# Patient Record
Sex: MALE | Employment: FULL TIME | URBAN - METROPOLITAN AREA
[De-identification: names, ages, dates, MRNs, and addresses within clinical notes are randomized per-mention and may not be internally consistent; named-entity substitution may affect disease eponyms.]

---

## 2018-08-21 ENCOUNTER — HOSPITAL ENCOUNTER (OUTPATIENT)
Dept: LAB | Age: 48
Discharge: HOME | End: 2018-08-21

## 2018-08-21 LAB — SENTARA SPECIMEN COL,SENBCF: NORMAL

## 2018-08-21 PROCEDURE — 99001 SPECIMEN HANDLING PT-LAB: CPT | Performed by: INTERNAL MEDICINE

## 2018-09-28 ENCOUNTER — HOSPITAL ENCOUNTER (OUTPATIENT)
Dept: LAB | Age: 48
Discharge: HOME | End: 2018-09-28

## 2018-09-28 LAB — SENTARA SPECIMEN COL,SENBCF: NORMAL

## 2018-09-28 PROCEDURE — 99001 SPECIMEN HANDLING PT-LAB: CPT | Performed by: INTERNAL MEDICINE

## 2019-06-20 ENCOUNTER — APPOINTMENT (OUTPATIENT)
Dept: CT IMAGING | Age: 49
End: 2019-06-20
Attending: EMERGENCY MEDICINE
Payer: SELF-PAY

## 2019-06-20 ENCOUNTER — HOSPITAL ENCOUNTER (EMERGENCY)
Age: 49
Discharge: HOME | End: 2019-06-20
Attending: EMERGENCY MEDICINE
Payer: SELF-PAY

## 2019-06-20 VITALS
BODY MASS INDEX: 29.02 KG/M2 | HEART RATE: 59 BPM | DIASTOLIC BLOOD PRESSURE: 94 MMHG | SYSTOLIC BLOOD PRESSURE: 130 MMHG | WEIGHT: 170 LBS | OXYGEN SATURATION: 97 % | HEIGHT: 64 IN | RESPIRATION RATE: 17 BRPM | TEMPERATURE: 98.1 F

## 2019-06-20 DIAGNOSIS — N20.0 KIDNEY STONE: Primary | ICD-10-CM

## 2019-06-20 LAB
ALBUMIN SERPL-MCNC: 3.9 G/DL (ref 3.4–5)
ALBUMIN/GLOB SERPL: 1.2 {RATIO} (ref 0.8–1.7)
ALP SERPL-CCNC: 105 U/L (ref 45–117)
ALT SERPL-CCNC: 52 U/L (ref 16–61)
ANION GAP SERPL CALC-SCNC: 9 MMOL/L (ref 3–18)
APPEARANCE UR: ABNORMAL
AST SERPL-CCNC: 25 U/L (ref 15–37)
BACTERIA URNS QL MICRO: ABNORMAL /HPF
BASOPHILS # BLD: 0.1 K/UL (ref 0–0.1)
BASOPHILS NFR BLD: 1 % (ref 0–2)
BILIRUB SERPL-MCNC: 0.5 MG/DL (ref 0.2–1)
BILIRUB UR QL: NEGATIVE
BUN SERPL-MCNC: 20 MG/DL (ref 7–18)
BUN/CREAT SERPL: 19 (ref 12–20)
CALCIUM SERPL-MCNC: 8.9 MG/DL (ref 8.5–10.1)
CAOX CRY URNS QL MICRO: ABNORMAL
CHLORIDE SERPL-SCNC: 106 MMOL/L (ref 100–108)
CO2 SERPL-SCNC: 25 MMOL/L (ref 21–32)
COLOR UR: YELLOW
CREAT SERPL-MCNC: 1.03 MG/DL (ref 0.6–1.3)
DIFFERENTIAL METHOD BLD: NORMAL
EOSINOPHIL # BLD: 0.1 K/UL (ref 0–0.4)
EOSINOPHIL NFR BLD: 2 % (ref 0–5)
EPITH CASTS URNS QL MICRO: ABNORMAL /LPF (ref 0–5)
ERYTHROCYTE [DISTWIDTH] IN BLOOD BY AUTOMATED COUNT: 12.7 % (ref 11.6–14.5)
GLOBULIN SER CALC-MCNC: 3.2 G/DL (ref 2–4)
GLUCOSE SERPL-MCNC: 228 MG/DL (ref 74–99)
GLUCOSE UR STRIP.AUTO-MCNC: 250 MG/DL
HCT VFR BLD AUTO: 44 % (ref 36–48)
HGB BLD-MCNC: 15 G/DL (ref 13–16)
HGB UR QL STRIP: ABNORMAL
KETONES UR QL STRIP.AUTO: ABNORMAL MG/DL
LEUKOCYTE ESTERASE UR QL STRIP.AUTO: ABNORMAL
LYMPHOCYTES # BLD: 1.9 K/UL (ref 0.9–3.6)
LYMPHOCYTES NFR BLD: 23 % (ref 21–52)
MCH RBC QN AUTO: 30.6 PG (ref 24–34)
MCHC RBC AUTO-ENTMCNC: 34.1 G/DL (ref 31–37)
MCV RBC AUTO: 89.8 FL (ref 74–97)
MONOCYTES # BLD: 0.6 K/UL (ref 0.05–1.2)
MONOCYTES NFR BLD: 7 % (ref 3–10)
NEUTS SEG # BLD: 5.7 K/UL (ref 1.8–8)
NEUTS SEG NFR BLD: 67 % (ref 40–73)
NITRITE UR QL STRIP.AUTO: NEGATIVE
PH UR STRIP: 5.5 [PH] (ref 5–8)
PLATELET # BLD AUTO: 277 K/UL (ref 135–420)
PMV BLD AUTO: 9.2 FL (ref 9.2–11.8)
POTASSIUM SERPL-SCNC: 3.9 MMOL/L (ref 3.5–5.5)
PROT SERPL-MCNC: 7.1 G/DL (ref 6.4–8.2)
PROT UR STRIP-MCNC: 30 MG/DL
RBC # BLD AUTO: 4.9 M/UL (ref 4.7–5.5)
RBC #/AREA URNS HPF: ABNORMAL /HPF (ref 0–5)
SODIUM SERPL-SCNC: 140 MMOL/L (ref 136–145)
SP GR UR REFRACTOMETRY: 1.02 (ref 1–1.03)
UROBILINOGEN UR QL STRIP.AUTO: 1 EU/DL (ref 0.2–1)
WBC # BLD AUTO: 8.4 K/UL (ref 4.6–13.2)
WBC URNS QL MICRO: ABNORMAL /HPF (ref 0–4)

## 2019-06-20 PROCEDURE — 96361 HYDRATE IV INFUSION ADD-ON: CPT

## 2019-06-20 PROCEDURE — 85025 COMPLETE CBC W/AUTO DIFF WBC: CPT

## 2019-06-20 PROCEDURE — 81001 URINALYSIS AUTO W/SCOPE: CPT

## 2019-06-20 PROCEDURE — 80053 COMPREHEN METABOLIC PANEL: CPT

## 2019-06-20 PROCEDURE — 74011250636 HC RX REV CODE- 250/636: Performed by: EMERGENCY MEDICINE

## 2019-06-20 PROCEDURE — 96374 THER/PROPH/DIAG INJ IV PUSH: CPT

## 2019-06-20 PROCEDURE — 74011636320 HC RX REV CODE- 636/320: Performed by: EMERGENCY MEDICINE

## 2019-06-20 PROCEDURE — 99285 EMERGENCY DEPT VISIT HI MDM: CPT

## 2019-06-20 PROCEDURE — 96375 TX/PRO/DX INJ NEW DRUG ADDON: CPT

## 2019-06-20 PROCEDURE — 74177 CT ABD & PELVIS W/CONTRAST: CPT

## 2019-06-20 RX ORDER — KETOROLAC TROMETHAMINE 30 MG/ML
30 INJECTION, SOLUTION INTRAMUSCULAR; INTRAVENOUS ONCE
Status: COMPLETED | OUTPATIENT
Start: 2019-06-20 | End: 2019-06-20

## 2019-06-20 RX ORDER — TAMSULOSIN HYDROCHLORIDE 0.4 MG/1
CAPSULE ORAL
Qty: 10 CAP | Refills: 0 | Status: SHIPPED | OUTPATIENT
Start: 2019-06-20

## 2019-06-20 RX ORDER — IBUPROFEN 800 MG/1
800 TABLET ORAL EVERY 8 HOURS
Qty: 15 TAB | Refills: 0 | Status: SHIPPED | OUTPATIENT
Start: 2019-06-20 | End: 2019-06-25

## 2019-06-20 RX ORDER — MORPHINE SULFATE 2 MG/ML
4 INJECTION, SOLUTION INTRAMUSCULAR; INTRAVENOUS ONCE
Status: COMPLETED | OUTPATIENT
Start: 2019-06-20 | End: 2019-06-20

## 2019-06-20 RX ORDER — HYDROCODONE BITARTRATE AND ACETAMINOPHEN 5; 325 MG/1; MG/1
1 TABLET ORAL
Qty: 6 TAB | Refills: 0 | Status: SHIPPED | OUTPATIENT
Start: 2019-06-20 | End: 2019-06-23

## 2019-06-20 RX ADMIN — SODIUM CHLORIDE 1000 ML: 900 INJECTION, SOLUTION INTRAVENOUS at 08:01

## 2019-06-20 RX ADMIN — KETOROLAC TROMETHAMINE 30 MG: 30 INJECTION, SOLUTION INTRAMUSCULAR at 08:01

## 2019-06-20 RX ADMIN — IOPAMIDOL 98 ML: 612 INJECTION, SOLUTION INTRAVENOUS at 08:33

## 2019-06-20 RX ADMIN — MORPHINE SULFATE 4 MG: 2 INJECTION, SOLUTION INTRAMUSCULAR; INTRAVENOUS at 08:02

## 2019-06-20 NOTE — ED TRIAGE NOTES
Arrived with EMS. C/o upper left abdominal pain and cramping. Feels like needs to move bowels. Last BM at 0500 today, small amount  With feel of need to move more.

## 2019-06-20 NOTE — DISCHARGE INSTRUCTIONS
Patient Education        Cálculo renal: Instrucciones de cuidado - [ Kidney Stone: Care Instructions ]  Instrucciones de cuidado    Los cálculos renales se hima cuando se aglutinan sales, minerales y otras sustancias que normalmente se encuentran en la orina. Pueden ser sidhu pequeños carmela granos de arena o, raras veces, tan grandes carmela pelotas de golf. Mientras el cálculo se desplaza por el uréter, que es el tubo que transporta la [place] del riñón a la vejiga, probablemente sienta dolor. El dolor puede ser leve o muy jomar. También puede humberto algo de archie en la orina. En cuanto el cálculo llega a la vejiga, todo dolor intenso debería desaparecer. Si un cálculo es demasiado mark para ser expulsado por luna propia cuenta, quizás requiera un procedimiento médico para ayudarle a eliminar el cálculo. El médico lo barrientos revisado detenidamente, michael pueden desarrollarse problemas más tarde. Si nota algún problema o nuevos síntomas, busque tratamiento médico de inmediato. La atención de seguimiento es karina parte clave de luna tratamiento y seguridad. Asegúrese de hacer y acudir a todas las citas, y llame a luna médico si está teniendo problemas. También es karina buena idea saber los resultados de ava exámenes y mantener karina lista de los medicamentos que glynn. ¿Cómo puede cuidarse en el hogar? · Lizbeth líquidos en abundancia, lo suficiente para que luna orina sea de color amarillo [place] o transparente carmela el agua. Si tiene enfermedad renal, cardíaca o hepática y tiene que restringir los líquidos, hable con luna médico antes de aumentar la cantidad de líquidos que senthil. · [employer] analgésicos (medicamentos para el dolor) exactamente según lo indicado. Llame a luna médico si red que está teniendo un problema con luna medicamento. ? Si el médico le recetó un analgésico, tómelo según lo prescrito. ? Si no está tomando un analgésico recetado, pregúntele a luna médico si puede gilda mary de [employer].  Isabelle y [address] de la etiqueta. · Quizás luna médico le pida que cuele la orina para que pueda recoger el cálculo renal cuando lo elimine. Puede usar un colador de cocina o de té para atrapar el cálculo. Guárdelo en karina bolsa de plástico hasta que boby a luna médico de nuevo. Cómo prevenir cálculos renales en el futuro  Algunos cambios en luna dieta pueden ayudar a prevenir los cálculos renales. Dependiendo de la causa de los cálculos, puede que luna médico le recomiende que:  · Lizbeth líquidos en abundancia, lo suficiente para que luna orina sea de color am[place]illo [place] o transparente carmela el agua. Si tiene enfermedad renal, cardíaca o hepática y tiene que restringir los líquidos, hable con luna médico antes de aumentar la cantidad de líquidos que senthil. · Restrinja el café, el té y el alcohol. Además, evite el jugo de toronja. · No tome más de la dosis diaria recomendada de vitaminas C y D.  · Evite los antiácidos carmela Gaviscon, Maalox, Mylanta o Tums. · Restrinja la cantidad de sal (sodio) en luna dieta. · Consuma karina dieta equilibrada que no sea demasiado yue en proteína. · Restrinja alimentos que odilia ricos en karina sustancia llamada oxalato, que puede causar cálculos renales. Estos alimentos [employer] verduras de color linda oscuro, el ruibarbo, el chocolate, el salvado de luiz, las nueces, los arándanos y los frijoles. ¿Cuándo debe pedir ayuda? Llame a luna médico ahora mismo o busque atención médica inmediata si:    · No puede retener líquidos.     · Luna dolor empeora.     · Tiene fiebre o escalofríos.     · Tiene dolor de espalda nuevo o peor, zaki debajo de la caja torácica (el costado).     · Tiene nueva o más archie en la orina.    Vigile de cerca los cambios en luna mainor y asegúrese de comunicarse con luna médico si:    · No mejora carmela se esperaba. ¿Dónde puede encontrar más información en inglés? Roberto Lagunas a [email or web address] m.[email or web address]   [employer] G800 en la búsqueda para aprender más acerca de \"Cálculo renal: Instrucciones de cuidado - [ Kidney Stone: Care Instructions ]. \"  Revisado: 14 marzo, 2018  Versión del contenido: 11.9  © 4790-8669 [employer]wise, Inc[employer]. Las instrucciones de cuidado fueron adaptadas bajo licencia por [employer] (which disclaims liability or warranty for this information). Si usted tiene Russell Lowman afección médica o sobre estas instrucciones, siempre pregunte a luna profesional de mainor. [employer], [employer] niega toda garantía o responsabilidad por luna uso de esta información.

## 2019-06-20 NOTE — ED PROVIDER NOTES
EMERGENCY DEPARTMENT HISTORY AND PHYSICAL EXAM    7:38 AM      Date: 6/20/2019  Patient Name: Narendra Tolliver    History of Presenting Illness     Chief Complaint   Patient presents with    Abdominal Pain         History Provided By: patient    Additional History (Context): Narendra Tolliver is a 53 y.o. male presents with left lower quadrant abdominal pain that started at 5 AM this morning it is severe and constant. No fevers. No vomiting or diarrhea. Has not had a bowel movement since the pain started. No chest pain shortness of breath or urinary symptoms. The patient speaks English well enough to get a reliable history and physical exam..    PCP: None    Chief Complaint:   Duration:    Timing:    Location:   Quality:   Severity:   Modifying Factors:   Associated Symptoms:           Past History     Past Medical History:  History reviewed. No pertinent past medical history. Past Surgical History:  History reviewed. No pertinent surgical history. Family History:  History reviewed. No pertinent family history. Social History:  Social History     Tobacco Use    Smoking status: Unknown If Ever Smoked    Smokeless tobacco: Never Used   Substance Use Topics    Alcohol use: Not on file    Drug use: Not on file       Allergies:  No Known Allergies      Review of Systems     Review of Systems   Constitutional: Negative for diaphoresis and fever. HENT: Negative for congestion and sore throat. Eyes: Negative for pain and itching. Respiratory: Negative for cough and shortness of breath. Cardiovascular: Negative for chest pain and palpitations. Gastrointestinal: Positive for abdominal pain. Negative for diarrhea. Endocrine: Negative for polydipsia and polyuria. Genitourinary: Negative for dysuria and hematuria. Musculoskeletal: Negative for arthralgias and myalgias. Skin: Negative for rash and wound. Neurological: Negative for seizures and syncope.    Hematological: Does not bruise/bleed easily. Psychiatric/Behavioral: Negative for agitation and hallucinations. Physical Exam       Patient Vitals for the past 12 hrs:   Temp Pulse Resp BP SpO2   06/20/19 0930  74 22 (!) 125/94 97 %   06/20/19 0915  72 20 124/83 96 %   06/20/19 0900  76 25 (!) 122/92 97 %   06/20/19 0845  73 21 123/85 98 %   06/20/19 0815  68 15 115/63 95 %   06/20/19 0800  70 20 119/74 94 %   06/20/19 0745  80 22 140/90 96 %   06/20/19 0740 98.1 °F (36.7 °C) 65 17 135/90 97 %       Physical Exam   Constitutional: He appears well-developed and well-nourished. HENT:   Head: Normocephalic and atraumatic. Eyes: Conjunctivae are normal. No scleral icterus. Neck: Normal range of motion. Neck supple. No JVD present. Cardiovascular: Normal rate, regular rhythm and normal heart sounds. 4 intact extremity pulses   Pulmonary/Chest: Effort normal and breath sounds normal.   Abdominal: Soft. He exhibits no mass. There is tenderness (Left lower quadrant tenderness). Musculoskeletal: Normal range of motion. Lymphadenopathy:     He has no cervical adenopathy. Neurological: He is alert. Skin: Skin is warm and dry. Nursing note and vitals reviewed. Diagnostic Study Results   Labs -  Recent Results (from the past 12 hour(s))   CBC WITH AUTOMATED DIFF    Collection Time: 06/20/19  7:46 AM   Result Value Ref Range    WBC 8.4 4.6 - 13.2 K/uL    RBC 4.90 4.70 - 5.50 M/uL    HGB 15.0 13.0 - 16.0 g/dL    HCT 44.0 36.0 - 48.0 %    MCV 89.8 74.0 - 97.0 FL    MCH 30.6 24.0 - 34.0 PG    MCHC 34.1 31.0 - 37.0 g/dL    RDW 12.7 11.6 - 14.5 %    PLATELET 061 081 - 357 K/uL    MPV 9.2 9.2 - 11.8 FL    NEUTROPHILS 67 40 - 73 %    LYMPHOCYTES 23 21 - 52 %    MONOCYTES 7 3 - 10 %    EOSINOPHILS 2 0 - 5 %    BASOPHILS 1 0 - 2 %    ABS. NEUTROPHILS 5.7 1.8 - 8.0 K/UL    ABS. LYMPHOCYTES 1.9 0.9 - 3.6 K/UL    ABS. MONOCYTES 0.6 0.05 - 1.2 K/UL    ABS. EOSINOPHILS 0.1 0.0 - 0.4 K/UL    ABS.  BASOPHILS 0.1 0.0 - 0.1 K/UL    DF AUTOMATED     METABOLIC PANEL, COMPREHENSIVE    Collection Time: 06/20/19  7:46 AM   Result Value Ref Range    Sodium 140 136 - 145 mmol/L    Potassium 3.9 3.5 - 5.5 mmol/L    Chloride 106 100 - 108 mmol/L    CO2 25 21 - 32 mmol/L    Anion gap 9 3.0 - 18 mmol/L    Glucose 228 (H) 74 - 99 mg/dL    BUN 20 (H) 7.0 - 18 MG/DL    Creatinine 1.03 0.6 - 1.3 MG/DL    BUN/Creatinine ratio 19 12 - 20      GFR est AA >60 >60 ml/min/1.73m2    GFR est non-AA >60 >60 ml/min/1.73m2    Calcium 8.9 8.5 - 10.1 MG/DL    Bilirubin, total 0.5 0.2 - 1.0 MG/DL    ALT (SGPT) 52 16 - 61 U/L    AST (SGOT) 25 15 - 37 U/L    Alk. phosphatase 105 45 - 117 U/L    Protein, total 7.1 6.4 - 8.2 g/dL    Albumin 3.9 3.4 - 5.0 g/dL    Globulin 3.2 2.0 - 4.0 g/dL    A-G Ratio 1.2 0.8 - 1.7     URINALYSIS W/ RFLX MICROSCOPIC    Collection Time: 06/20/19  7:48 AM   Result Value Ref Range    Color YELLOW      Appearance CLOUDY      Specific gravity 1.024 1.005 - 1.030      pH (UA) 5.5 5.0 - 8.0      Protein 30 (A) NEG mg/dL    Glucose 250 (A) NEG mg/dL    Ketone TRACE (A) NEG mg/dL    Bilirubin NEGATIVE  NEG      Blood LARGE (A) NEG      Urobilinogen 1.0 0.2 - 1.0 EU/dL    Nitrites NEGATIVE  NEG      Leukocyte Esterase TRACE (A) NEG     URINE MICROSCOPIC ONLY    Collection Time: 06/20/19  7:48 AM   Result Value Ref Range    WBC 1 to 3 0 - 4 /hpf    RBC TOO NUMEROUS TO COUNT 0 - 5 /hpf    Epithelial cells FEW 0 - 5 /lpf    Bacteria 2+ (A) NEG /hpf    CA Oxalate crystals 2+ (A) NEG       Radiologic Studies -   CT ABD PELV W CONT   Final Result   IMPRESSION:         1. 4 mm calculus in the distal left ureter just proximal to the left UVJ with   mild left hydronephrosis and hydroureter proximal to this calculus. 2. Nonobstructing 3 mm lower pole left renal calculus. 3. Few scattered colonic diverticula without signs of diverticulitis. 4. Hepatic steatosis.         Ct Abd Pelv W Cont    Result Date: 6/20/2019  EXAM: CT of the abdomen and pelvis INDICATION: Left lower quadrant abdominal pain. COMPARISON: None TECHNIQUE: Helical volumetric scanning through the abdomen and pelvis was performed without oral, with nonionic intravenous contrast.  Axial, coronal and sagittal reconstructions were created. One or more dose reduction techniques were used on this CT: automated exposure control, adjustment of the mAs and/or kVp according to patient size, and iterative reconstruction techniques. The specific techniques used on this CT exam have been documented in the patient's electronic medical record. Digital Imaging and Communications in Medicine (DICOM) format image data are available to nonaffiliated external healthcare facilities or entities on a secure, media free, reciprocally searchable basis with patient authorization for at least a 12-month period after this study. _______________ FINDINGS: LOWER CHEST: Visualized lung bases are clear. No pleural or pericardial effusion. LIVER, BILIARY: The liver is diffusely, moderately low density without a focal lesion. No biliary dilation. Gallbladder is unremarkable. PANCREAS: Normal. SPLEEN: Normal. ADRENALS: Normal. KIDNEYS: Nonobstructing lower pole 3 mm calculus is present in the left kidney. The left collecting system and ureter are mildly dilated extending to the left UVJ where a 4 mm calculus is less in the distal left ureter. No renal parenchymal lesions. LYMPH NODES: No enlarged lymph nodes. GASTROINTESTINAL TRACT: Few scattered colonic diverticula are present. No signs of diverticulitis. No bowel wall thickening or bowel dilatation. The appendix is normal. PELVIC ORGANS: Within normal limits. VASCULATURE: Within normal limits. BONES: No acute or aggressive osseous abnormalities identified.  OTHER: Tiny fat-containing umbilical hernia is present. _______________     IMPRESSION: 1. 4 mm calculus in the distal left ureter just proximal to the left UVJ with mild left hydronephrosis and hydroureter proximal to this calculus. 2. Nonobstructing 3 mm lower pole left renal calculus. 3. Few scattered colonic diverticula without signs of diverticulitis. 4. Hepatic steatosis. Medications ordered:   Medications   morphine injection 4 mg (4 mg IntraVENous Given 6/20/19 0802)   sodium chloride 0.9 % bolus infusion 1,000 mL (1,000 mL IntraVENous New Bag 6/20/19 0801)   ketorolac (TORADOL) injection 30 mg (30 mg IntraVENous Given 6/20/19 0801)   iopamidol (ISOVUE 300) 61 % contrast injection 100 mL (98 mL IntraVENous Given 6/20/19 0465)         Medical Decision Making   Initial Medical Decision Making and DDx:     Diverticulitis, less likely atypical presentation of appendicitis bowel obstruction bowel perforation. Constipation is also possible, kidney stone also possible    ED Course: Progress Notes, Reevaluation, and Consults:     10:22 AM CT shows kidney stone. UA shows no signs of infection. Patient's pain is gone with the medications we have given him. Assisted with we will translate, discussed results answered questions Flomax and pain control. He is happy with plan for discharge at this point. 10:23 AM Pt reevaluated at this time. Discussed results and findings, as well as, diagnosis and plan for discharge. Follow up with doctors/services listed. Return to the emergency department for alarming symptoms. Pt verbalizes understanding and agreement with plan. All questions addressed. Reviewed in [facility] and no alarming patterns. I am the first provider for this patient. I reviewed the vital signs, available nursing notes, past medical history, past surgical history, family history and social history.     Patient Vitals for the past 12 hrs:   Temp Pulse Resp BP SpO2   06/20/19 0930  74 22 (!) 125/94 97 %   06/20/19 0915  72 20 124/83 96 %   06/20/19 0900  76 25 (!) 122/92 97 %   06/20/19 0845  73 21 123/85 98 %   06/20/19 0815  68 15 115/63 95 %   06/20/19 0800  70 20 119/74 94 %   06/20/19 0745  80 22 140/90 96 %   06/20/19 0740 98.1 °F (36.7 °C) 65 17 135/90 97 %       Vital Signs-Reviewed the patient's vital signs. Pulse Oximetry Analysis, Cardiac Monitor, 12 lead ekg:      Interpreted by the EP. Records Reviewed: Nursing notes reviewed (Time of Review: 7:38 AM)    Procedures:   Critical Care Time:   Aspirin: (was aspirin given for stroke?)    Diagnosis     Clinical Impression:   1. Kidney stone        Disposition: Discharged      Follow-up Information     Follow up With Specialties Details Why Contact Info    [facility]  In 2 days  [address] [place] [address]  [phone]           Patient's Medications   Start Taking    HYDROCODONE-ACETAMINOPHEN (NORCO) 5-325 MG PER TABLET    Take 1 Tab by mouth every four (4) hours as needed for Pain for up to 3 days. Max Daily Amount: 6 Tabs. Take 1-2 tablets PO every 4-6 hours as needed for pain control. If over the counter ibuprofen or acetaminophen was suggested, then only take the vicodin for pain not well controlled with the o    IBUPROFEN (MOTRIN) 800 MG TABLET    Take 1 Tab by mouth every eight (8) hours for 5 days. TAMSULOSIN (FLOMAX) 0.4 MG CAPSULE    Take 1 tab by mouth daily until kidney stone passes.    Continue Taking    No medications on file   These Medications have changed    No medications on file   Stop Taking    No medications on file     _______________________________    Notes:    Justin Sanchez MD using Dragon dictation      _______________________________

## 2019-06-20 NOTE — ED NOTES
Pt returned from CT via wheelchair. States feels better. IV fluid continued and monitor x 4 BP and pulse ox reattatched.

## 2019-06-20 NOTE — ED NOTES
Monitor x4 applied. Pulse ox on. Room air. BP cycling. Dr. Trini Ochoa in room to see pt. Orders written.